# Patient Record
Sex: FEMALE | Race: ASIAN | NOT HISPANIC OR LATINO | URBAN - METROPOLITAN AREA
[De-identification: names, ages, dates, MRNs, and addresses within clinical notes are randomized per-mention and may not be internally consistent; named-entity substitution may affect disease eponyms.]

---

## 2019-04-18 ENCOUNTER — EMERGENCY (EMERGENCY)
Facility: HOSPITAL | Age: 19
LOS: 1 days | Discharge: HOME | End: 2019-04-18
Attending: EMERGENCY MEDICINE
Payer: COMMERCIAL

## 2019-04-18 VITALS
SYSTOLIC BLOOD PRESSURE: 107 MMHG | OXYGEN SATURATION: 100 % | TEMPERATURE: 99 F | RESPIRATION RATE: 16 BRPM | DIASTOLIC BLOOD PRESSURE: 69 MMHG | HEART RATE: 97 BPM

## 2019-04-18 VITALS
WEIGHT: 85.1 LBS | TEMPERATURE: 99 F | SYSTOLIC BLOOD PRESSURE: 117 MMHG | DIASTOLIC BLOOD PRESSURE: 74 MMHG | HEART RATE: 86 BPM | RESPIRATION RATE: 16 BRPM | OXYGEN SATURATION: 95 % | HEIGHT: 62 IN

## 2019-04-18 LAB
HCG UR QL: NEGATIVE — SIGNIFICANT CHANGE UP
HIV 1 & 2 AB SERPL IA.RAPID: SIGNIFICANT CHANGE UP

## 2019-04-18 PROCEDURE — 99283 EMERGENCY DEPT VISIT LOW MDM: CPT | Mod: 25

## 2019-04-18 PROCEDURE — 71046 X-RAY EXAM CHEST 2 VIEWS: CPT

## 2019-04-18 PROCEDURE — 86703 HIV-1/HIV-2 1 RESULT ANTBDY: CPT

## 2019-04-18 PROCEDURE — 93005 ELECTROCARDIOGRAM TRACING: CPT

## 2019-04-18 PROCEDURE — 99284 EMERGENCY DEPT VISIT MOD MDM: CPT | Mod: 25

## 2019-04-18 PROCEDURE — 81025 URINE PREGNANCY TEST: CPT

## 2019-04-18 PROCEDURE — 93010 ELECTROCARDIOGRAM REPORT: CPT

## 2019-04-18 PROCEDURE — 71046 X-RAY EXAM CHEST 2 VIEWS: CPT | Mod: 26

## 2019-04-18 NOTE — ED PROVIDER NOTE - CARDIAC, MLM
Normal rate, regular rhythm.  Heart sounds S1, S2.  No murmurs, rubs or gallops. No overlying rash or ecchymosis. No LE edema.

## 2019-04-18 NOTE — ED PROVIDER NOTE - PROGRESS NOTE DETAILS
Sadia Leyva, resident MD: cxr is negative for emergent finding. pt is currently asymptomatic. discussed xray results with pt. Will discharge patient home at this time. Patient was given verbal and written instructions of return precautions and need for outpatient follow up. Pt re-evaluated. FEels well, wishes to be d/c'd. F/u instructions, return precautions d/w patient who expresses understanding. An opportunity to ask questions was provided and all answered. - Pedro Ventura MD

## 2019-04-18 NOTE — ED PROVIDER NOTE - NSFOLLOWUPCLINICS_GEN_ALL_ED_FT
[facility]  Cardiology  [address] [place] [id number]  Phone: [phone]  Fax:   Follow Up Time:

## 2019-04-18 NOTE — ED PROVIDER NOTE - NSFOLLOWUPINSTRUCTIONS_ED_ALL_ED_FT
Please follow up with your primary care provider in the next few days.    You can take 500mg tylenol every 6 hours as needed for pain.  You can also take 400mg ibuprofen every 6 hours as needed for pain, make sure to take with food.     Return to the ED for any worsening symptoms of chest pain, difficulty breathing, nausea or vomiting, or any new or concerning symptoms.    Please read all attached. Please follow up with your primary care provider within the next week, you can also follow up with a cardiologist in the next few days.    You can take 500mg tylenol every 6 hours as needed for pain.  You can also take 400mg ibuprofen every 6 hours as needed for pain, make sure to take with food.     Return to the ED for any worsening symptoms of chest pain, difficulty breathing, nausea or vomiting, or any new or concerning symptoms.    Please read all attached.

## 2019-04-18 NOTE — ED PROVIDER NOTE - ATTENDING CONTRIBUTION TO CARE
18F, pmh astigmatism, psh eye surgery, presents with intermittent cp, radiating to L arm x 3 days. "Tightness," with associated numbness/tingling to L arm. Pt believes occurs when she feels very anxious. Denies sob, palpitations, n/v, lightheadedness, dizziness, headache, neck pain. No family hx heart disease. No cigarette smoking, occasional marijuana use, denies any other drug use, rare etoh use. No abd pain, dysuria, hematuria. Currently menstruating.    PE: Well appearing young female in NAD, NCAT, MMM, Trachea midline, Normal conjunctiva, lungs CTAB, S1/S2 RRR, Normal perfusion, 2+ radial pulses bilat, Abdomen Soft, NTND, No rebound/guarding, No LE edema, No deformity of extremities, No rashes,  No focal motor or sensory deficits.     Pt well appearing. VS without sig abnormality. EKG without ischemic findings. No risk factors for heart disease, extremely low suspicion primary cardiac pathology. Low suspicion pulm process, however will check cxr. Pt requesting HIV test. Check preg. Re-eval, plan for likely d/c with outpatient f/u. - Pedro Ventura MD

## 2019-04-18 NOTE — ED PROVIDER NOTE - CLINICAL SUMMARY MEDICAL DECISION MAKING FREE TEXT BOX
19yo woman presents with intermittent pleuritic chest pain x 3 days with no associated SOB, n/v, diaphoresis, no family history of cardiac dz, no recent URI symptoms. PERC negative and low suspicion for PE. Low suspicion for ACS with normal ECG. will obtain CXR to r/o pna vs pneumo but low suspicion with normal exam. will continue to monitor and reassess. pt is currently asymptomatic and does not require any pain control. 17yo woman presents with intermittent pleuritic chest pain x 3 days with no associated SOB, n/v, diaphoresis, no family history of cardiac dz, no recent URI symptoms. PERC negative and low suspicion for PE. Low suspicion for ACS with normal ECG. will obtain CXR to r/o pna vs pneumo but low suspicion with normal exam. no substance history. possible anxiety induced. will continue to monitor and reassess. pt is currently asymptomatic and does not require any pain control. 19yo woman presents with intermittent pleuritic chest pain x 3 days with no associated SOB, n/v, diaphoresis, no family history of cardiac disease, no recent URI symptoms. PERC negative and low suspicion for PE. Low suspicion for ACS with normal ECG. will obtain CXR to r/o pna vs pneumo but low suspicion with normal exam. no substance history. possible anxiety induced. will continue to monitor and reassess. pt is currently asymptomatic and does not require any pain control.

## 2019-04-18 NOTE — ED PROVIDER NOTE - NS ED ROS FT
General: no fevers or chills  Head: no headache  Eyes: no vision changes  ENT: no congestion or runny nose, no neck pain  CV: +chest pain  Resp: no SOB, no cough  GI: no N/V, no abdominal pain  : no dysuria, no vaginal discharge  MSK: +left arm pain  Skin: no rash on chest  Neuro: no focal weakness, no change in sensation

## 2019-04-18 NOTE — ED ADULT NURSE NOTE - NSIMPLEMENTINTERV_GEN_ALL_ED
Implemented All Universal Safety Interventions:  [place] to call system. Call bell, personal items and telephone within reach. Instruct patient to call for assistance. Room bathroom lighting operational. Non-slip footwear when patient is off stretcher. Physically safe environment: no spills, clutter or unnecessary equipment. Stretcher in lowest position, wheels locked, appropriate side rails in place.

## 2019-04-18 NOTE — ED PROVIDER NOTE - MUSCULOSKELETAL, MLM
Spine appears normal, range of motion is not limited, no muscle or joint tenderness, no tenderness to palpation of lower leg

## 2019-04-18 NOTE — ED PROVIDER NOTE - CARE PROVIDER_API CALL
Ray Corral)  Pediatrics  [address] [place] [address]  Phone: [phone]  Fax: [phone]  Follow Up Time:

## 2019-04-18 NOTE — ED ADULT NURSE NOTE - OBJECTIVE STATEMENT
18 y.o. Female presents to the ED c/o chest pain x3 days. Pt reports she was feeling chest pain in midsternum 3 days ago and started to feel pain on L side of chest radiating to L arm. Pt states pain is intermittent - described it as "clenching" and "pressure." Pt states her left arm would feel numb and tingling x15 seconds and symptoms would improve. Denies pain at the moment, SOB, N/V/D, urinary/bowel complications, fever/chills. Pt reports smoking weed 1-2x/week. Last time she smoked was on Sunday. A&Ox3. Ambulatory. Well-appearing. Pt is in no current distress. Comfort ands safety provided. Will continue to monitor. Dr. Leyva at bedside for assessment.

## 2019-04-18 NOTE — ED PROVIDER NOTE - OBJECTIVE STATEMENT
19 y/o F w pmhx of astigmatism, pshx of eye surgery p/w intermittent pleuritic CP radiating to left arm x2-3days. Described as clenching, tight pressure sensation, w numbness tingling to left arm lasting 15 seconds. Pt states she felt the CP today 3x in span of 3 hours, latest episode at 11:30AM while at work. Endorsed Advil w no relief. No fhx of CAD. Denies any strenuous activity, no associated injury or trauma. Denies coughs, f/c, rhinorrhea, leg swelling, vaginal discharge, rashes, urinary sx, n/v/d, abd pain, or other complaints.  No recent traveling or long periods of immobilization. Pt currently sexually active. No OCP use. LMP now. Not on daily meds. NKDA. Pt admits occasional marijuana use, last endorsed 1 week ago, uses 1-2x a week at baseline. No illicit drug use. 19 y/o F w pmhx of astigmatism, pshx of eye surgery p/w intermittent pleuritic CP radiating to left arm x2-3days. Described as clenching, tight pressure sensation, w numbness tingling to left arm lasting 15 seconds. Pt states she felt the CP today 3x in span of 3 hours, latest episode at 11:30AM while at work. Endorsed Advil w no relief. No fhx of CAD. Denies associated SOB, n/v, diaphoresis. Denies any strenuous activity, no associated injury or trauma. Denies coughs, f/c, rhinorrhea, rashes, n/v, abd pain, or other complaints. No recent traveling or long periods of immobilization, no leg swelling or pain, no OCP use, no history of clots. Pt currently sexually active. LMP now. Not on daily meds. NKDA. Pt admits occasional marijuana use, last endorsed 1 week ago, uses 1-2x a week at baseline. No illicit drug/cocaine use.

## 2019-07-23 NOTE — ED ADULT NURSE NOTE - NS ED NURSE DC PT EDUCATION RESOURCES
Patient arrives to PACU able to follow simple commands. PIV intact and infusing. HOB slightly elevated. Vital signs stable. Will continue to monitor.   Yes

## 2021-03-01 ENCOUNTER — EMERGENCY (EMERGENCY)
Facility: HOSPITAL | Age: 21
LOS: 1 days | Discharge: HOME | End: 2021-03-01
Attending: EMERGENCY MEDICINE | Admitting: EMERGENCY MEDICINE
Payer: MEDICAID

## 2021-03-01 VITALS
RESPIRATION RATE: 18 BRPM | OXYGEN SATURATION: 97 % | HEART RATE: 101 BPM | SYSTOLIC BLOOD PRESSURE: 135 MMHG | TEMPERATURE: 98 F | DIASTOLIC BLOOD PRESSURE: 69 MMHG | HEIGHT: 62 IN

## 2021-03-01 PROBLEM — Z78.9 OTHER SPECIFIED HEALTH STATUS: Chronic | Status: ACTIVE | Noted: 2019-04-18

## 2021-03-01 PROCEDURE — 99284 EMERGENCY DEPT VISIT MOD MDM: CPT

## 2021-03-01 NOTE — ED PROVIDER NOTE - PHYSICAL EXAMINATION
GEN - NAD; well appearing; A+O x3   HEAD - NC/AT   EYES- PERRL, EOMI  ENT: Airway patent, mmm, Oral cavity and pharynx normal.   NECK: Neck supple, non-tender without lymphadenopathy, no masses.  PULMONARY - CTA b/l, symmetric breath sounds.   CARDIAC -s1s2, RRR, no M,G,R  Breast-chaperone-pa gorey, normal external skin, no regions of focal fluctuance/induration/erythema b/l, r. breast with multiple regions of fibrocystic type changes scattered on deep palpation, left breast with similar fibrocystic regions throughout, +1cm firm nodule to left upper breast region, nontender, mobile.    ABDOMEN - +BS, ND, NT, soft, no guarding, no rebound, no masses   BACK - no CVA tenderness, Normal  spine   EXTREMITIES - FROM,  no edema, mild ttp to left medial thigh muscle at region of tendon insertion, no swelling/deformity/redness, NVI, cap refill <2s, 2+ dp pulse. rle wnl.   SKIN - no rash or bruising   NEUROLOGIC - alert, speech clear, no focal deficits  PSYCH -nl mood/affect, nl insight.

## 2021-03-01 NOTE — ED PROVIDER NOTE - CLINICAL SUMMARY MEDICAL DECISION MAKING FREE TEXT BOX
Patient with b/l groin pain L>R s/p b/l le exercises over last few weeks, similar to prior, no swelling/weakness/numbness, also with nodule and lumps noticed to left breast, fibrocystic changes b/l to breast with single nodule to left upper breast, no signs of abscess, will refer to breast and ob/gyn for follow up care, already taking nsaids, run ucg, suspect groin pull, recommended abstaining from le exercises for now/rest/nsaids, and f/u pcp/ortho.

## 2021-03-01 NOTE — ED PROVIDER NOTE - OBJECTIVE STATEMENT
20F presents to the ed for lumps in left breast and thigh/groin pain. Patient states she has been doing squats and leg exercises and started having b/l groin pain L>R. Had similar pain when she was doing these exercises several months ago and it resolved after she started again. No swelling/redness/numbness/weakness to legs. No fevers, chills. ALso notes for last two weeks has noted lumps in her breast, no external redness/swelling. Has never seen a gyn/breast specialist. Has no nipple dc, has normal periods. Denies any pain to chest, no sob, no other complaints.

## 2021-03-01 NOTE — ED PROVIDER NOTE - PATIENT PORTAL LINK FT
You can access the FollowMyHealth Patient Portal offered by [facility] by registering at the following website: [email or web address]. By joining [employer]’s FollowMyHealth portal, you will also be able to view your health information using other applications (apps) compatible with our system.

## 2021-03-01 NOTE — ED PROVIDER NOTE - NS ED ROS FT
ROS:  GENERAL: No fever, no chills  EYES: no change in vision  HEENT: no trouble swallowing, no trouble speaking  CARDIAC: no chest pain  BREAST: + breast masses  PULMONARY: no cough, no shortness of breath  GI: no abdominal pain, no nausea, no vomiting, no diarrhea, no constipation  : No dysuria, no frequency, no change in appearance, or odor of urine  SKIN: no rashes  NEURO: no headache, no weakness  MSK: +b/l groin pain

## 2021-03-01 NOTE — ED PROVIDER NOTE - NSCAREINITIATED _GEN_ER
Mini Ambrose(Attending) Medical Necessity Clause: This procedure was medically necessary because the lesion that was treated was:

## 2021-03-01 NOTE — ED PROVIDER NOTE - NSFOLLOWUPINSTRUCTIONS_ED_ALL_ED_FT
Follow up with your Primary Medical Doctor in 1-2 days.  Follow up with Orthopedics in 1-2 days see attached list.  Follow up with Gynecology regarding breast lump for further workup and evaluation.(see attached list)  Follow up with Breast Surgery in 1-2 days.(see attached)  Rest.  Return to the ER for any persistent/worsening or new symptoms fevers, chills, redness, swelling, numbness, tingling or any concerning symptoms.

## 2021-03-20 ENCOUNTER — RESULT REVIEW (OUTPATIENT)
Age: 21
End: 2021-03-20

## 2021-07-09 ENCOUNTER — EMERGENCY (EMERGENCY)
Facility: HOSPITAL | Age: 21
LOS: 1 days | Discharge: LEFT BEFORE DISCHARGE | End: 2021-07-09
Admitting: EMERGENCY MEDICINE
Payer: MEDICAID

## 2021-07-09 VITALS
OXYGEN SATURATION: 100 % | DIASTOLIC BLOOD PRESSURE: 75 MMHG | HEART RATE: 82 BPM | TEMPERATURE: 98 F | HEIGHT: 62 IN | SYSTOLIC BLOOD PRESSURE: 126 MMHG | RESPIRATION RATE: 15 BRPM

## 2021-07-09 PROCEDURE — L9991: CPT

## 2021-07-09 NOTE — ED ADULT TRIAGE NOTE - CHIEF COMPLAINT QUOTE
pt c/o left sided wisdom tooth pain xfew days. pt states "I need it removed". Took 600mg motrin last night at 7pm.

## 2021-07-21 NOTE — ED ADULT TRIAGE NOTE - CCCP TRG CHIEF CMPLNT
chest pain Propranolol Pregnancy And Lactation Text: This medication is Pregnancy Category C and it isn't known if it is safe during pregnancy. It is excreted in breast milk.

## 2022-07-31 ENCOUNTER — EMERGENCY (EMERGENCY)
Facility: HOSPITAL | Age: 22
LOS: 1 days | Discharge: LEFT BEFORE DISCHARGE | End: 2022-07-31
Attending: EMERGENCY MEDICINE | Admitting: EMERGENCY MEDICINE

## 2022-07-31 VITALS
HEART RATE: 97 BPM | HEIGHT: 62 IN | DIASTOLIC BLOOD PRESSURE: 75 MMHG | RESPIRATION RATE: 16 BRPM | OXYGEN SATURATION: 100 % | TEMPERATURE: 98 F | SYSTOLIC BLOOD PRESSURE: 143 MMHG

## 2022-07-31 PROCEDURE — 99283 EMERGENCY DEPT VISIT LOW MDM: CPT

## 2022-07-31 NOTE — ED PROVIDER NOTE - PHYSICAL EXAMINATION
pt alert and can phonate well  h at/nc  perrl, conj clear, sclera anicteric,  neck supple  throat no exudate  cor rrr pos s1s2  lungs clear to asno wheeze  abd soft no r/g/t  ext minimal swelling and tenderness at 5th digit PIP   neuro awake, lucid normal gait moves all extremities with strength  psych normal affect  vs reasonable pt alert and can phonate well  h at/nc  perrl, conj clear, sclera anicteric,  neck supple  ext minimal swelling and tenderness at 5th digit PIP , otherwise joints normal  neuro awake, lucid normal gait moves all extremities with strength  psych normal affect  vs reasonable

## 2022-07-31 NOTE — ED PROVIDER NOTE - OBJECTIVE STATEMENT
23 y/o F w/ no pertinent PMHx and PSHx presents to ED c/o right  fifth digit pain, hurt on cruise. Pt reports she does not recall trauma. Denies allergies and medication. Notes no swelling or pain anywhere else. Finger is covered w/ bandages, swelling has decreased.    denies N/V/abd pain/ HA/ fever/ chest pain/ SOB/ dysuria/ urgency/ vaginal dc/ extremity issue 21 y/o F w/ no pertinent PMHx and PSHx presents to ED c/o right  fifth digit pain, was on cruise. Pt reports she does not recall trauma. Denies allergies and medication. Notes no swelling or pain anywhere else. Finger is covered w/ bandages, swelling has decreased.    denies N/V/abd pain/ HA/ fever/ chest pain/ SOB/

## 2022-07-31 NOTE — ED PROVIDER NOTE - CLINICAL SUMMARY MEDICAL DECISION MAKING FREE TEXT BOX
23 y/o F w/ no pertinent PMHx and PSHx presents to ED c/o right fifth digit pain, hurt on cruise. Minimal tenderness. Will x-ray and followup with hand surgery.

## 2022-07-31 NOTE — ED PROVIDER NOTE - PROGRESS NOTE DETAILS
pt noted to have eloped from the ED prior to xray.  no iv in place.  tried to call pt at number listed in the computer, left a message, no answer

## 2022-09-20 PROBLEM — Z00.00 ENCOUNTER FOR PREVENTIVE HEALTH EXAMINATION: Status: ACTIVE | Noted: 2022-09-20

## 2022-09-21 ENCOUNTER — APPOINTMENT (OUTPATIENT)
Dept: ORTHOPEDIC SURGERY | Facility: CLINIC | Age: 22
End: 2022-09-21

## 2025-02-13 ENCOUNTER — NON-APPOINTMENT (OUTPATIENT)
Age: 25
End: 2025-02-13